# Patient Record
Sex: MALE | NOT HISPANIC OR LATINO | URBAN - METROPOLITAN AREA
[De-identification: names, ages, dates, MRNs, and addresses within clinical notes are randomized per-mention and may not be internally consistent; named-entity substitution may affect disease eponyms.]

---

## 2017-11-09 ENCOUNTER — IMPORTED ENCOUNTER (OUTPATIENT)
Dept: URBAN - METROPOLITAN AREA CLINIC 1 | Facility: CLINIC | Age: 52
End: 2017-11-09

## 2017-11-09 PROBLEM — H52.02: Noted: 2017-11-09

## 2017-11-09 PROBLEM — H52.4: Noted: 2017-11-09

## 2017-11-09 PROCEDURE — S0621 ROUTINE OPHTHALMOLOGICAL EXA: HCPCS

## 2017-11-09 NOTE — PATIENT DISCUSSION
1.  Hyperopia: Rx was given for corrective spectacles if indicated. 2.  Presbyopia: Rx was given for corrective spectacles if indicated. 3.  (Glaucoma suspect OU (0.75/0.7): Stable IOP and C/D OU. Past w/u essentially normal.)Return for an appointment for a 36 in 1 year with Dr. Deangelo Murphy.

## 2018-11-08 ENCOUNTER — IMPORTED ENCOUNTER (OUTPATIENT)
Dept: URBAN - METROPOLITAN AREA CLINIC 1 | Facility: CLINIC | Age: 53
End: 2018-11-08

## 2018-11-08 PROBLEM — Z01.00: Noted: 2018-11-08

## 2018-11-08 PROCEDURE — S0621 ROUTINE OPHTHALMOLOGICAL EXA: HCPCS

## 2018-11-08 NOTE — PATIENT DISCUSSION
1.  Routine Exam- Patient has minimal refractive error. 2.  Presbyopia: Rx was given for corrective spectacles if indicated. 3.  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 Past w/u essentially normal. Observe. Return for an appointment in 1 yr 36 with Dr. Riki Joseph.

## 2019-12-19 ENCOUNTER — IMPORTED ENCOUNTER (OUTPATIENT)
Dept: URBAN - METROPOLITAN AREA CLINIC 1 | Facility: CLINIC | Age: 54
End: 2019-12-19

## 2019-12-19 PROBLEM — H52.03: Noted: 2019-12-19

## 2019-12-19 PROBLEM — H52.4: Noted: 2019-12-19

## 2019-12-19 PROCEDURE — S0621 ROUTINE OPHTHALMOLOGICAL EXA: HCPCS

## 2019-12-19 NOTE — PATIENT DISCUSSION
1.  Hyperopia: Rx was given for corrective spectacles if indicated. 2.  [id number]. Cataract OU - Observe 4. Glaucoma suspect OU (0.75/0.70): Stable IOP and C/D OU. Past w/u essentially normal. Observe. Return for an appointment in 1 year 36 with Dr. Jaja Spann.

## 2020-11-13 ENCOUNTER — IMPORTED ENCOUNTER (OUTPATIENT)
Dept: URBAN - METROPOLITAN AREA CLINIC 1 | Facility: CLINIC | Age: 55
End: 2020-11-13

## 2020-11-13 PROBLEM — H52.4: Noted: 2020-11-13

## 2020-11-13 PROBLEM — H52.03: Noted: 2020-11-13

## 2020-11-13 PROCEDURE — S0621 ROUTINE OPHTHALMOLOGICAL EXA: HCPCS

## 2020-11-13 NOTE — PATIENT DISCUSSION
1.  Hyperopia: Rx was given for corrective spectacles if indicated. 2.  [id number]. Cataract OU - Observe 4. Glaucoma suspect OU (0.75/0.70): Stable IOP and C/D OU. Past w/u essentially normal. Observe. Return for an appointment in 1 year 36 with Dr. Riki Joseph.

## 2021-11-19 ENCOUNTER — IMPORTED ENCOUNTER (OUTPATIENT)
Dept: URBAN - METROPOLITAN AREA CLINIC 1 | Facility: CLINIC | Age: 56
End: 2021-11-19

## 2021-11-19 PROBLEM — H52.221: Noted: 2021-11-19

## 2021-11-19 PROBLEM — H52.4: Noted: 2021-11-19

## 2021-11-19 PROBLEM — H52.03: Noted: 2021-11-19

## 2021-11-19 PROCEDURE — S0621 ROUTINE OPHTHALMOLOGICAL EXA: HCPCS

## 2021-11-19 NOTE — PATIENT DISCUSSION
1.  Hyperopia w/ Astigmatism OD -- Rx was given for corrective spectacles if indicated. 2.  [id number]. Cataract OU - Observe 4. Glaucoma suspect OU (0.75/0.70): Stable IOP and C/D OU. Past w/u essentially normal. Observe. Return for an appointment in 1 year 36 with Dr. Azul Garcia.

## 2022-04-02 ASSESSMENT — TONOMETRY
OD_IOP_MMHG: 18
OS_IOP_MMHG: 16
OD_IOP_MMHG: 14
OD_IOP_MMHG: 16
OD_IOP_MMHG: 18
OS_IOP_MMHG: 17
OS_IOP_MMHG: 13
OD_IOP_MMHG: 17
OS_IOP_MMHG: 18
OD_IOP_MMHG: 13
OS_IOP_MMHG: 14
OS_IOP_MMHG: 18

## 2022-04-02 ASSESSMENT — VISUAL ACUITY
OS_CC: 20/20
OD_SC: 20/20
OD_CC: 20/20
OS_CC: J1+
OS_CC: J1
OD_CC: J1
OS_CC: J1+
OD_CC: 20/25+2
OS_CC: 20/20
OS_SC: 20/20
OD_CC: J1+
OS_CC: 20/30
OD_CC: J1+
OD_SC: 20/20
OS_SC: 20/20
OD_CC: 20/20

## 2022-11-21 ENCOUNTER — COMPREHENSIVE EXAM (OUTPATIENT)
Dept: URBAN - METROPOLITAN AREA CLINIC 1 | Facility: CLINIC | Age: 57
End: 2022-11-21

## 2022-11-21 DIAGNOSIS — H52.03: ICD-10-CM

## 2022-11-21 DIAGNOSIS — H52.4: ICD-10-CM

## 2022-11-21 DIAGNOSIS — H52.221: ICD-10-CM

## 2022-11-21 PROCEDURE — 92015 DETERMINE REFRACTIVE STATE: CPT

## 2022-11-21 PROCEDURE — 92014 COMPRE OPH EXAM EST PT 1/>: CPT

## 2022-11-21 ASSESSMENT — VISUAL ACUITY
OD_CC: 20/20-2
OD_CC: J1
OS_CC: J2
OS_CC: 20/20-1
OU_CC: J1+

## 2022-11-21 ASSESSMENT — TONOMETRY
OD_IOP_MMHG: 14
OS_IOP_MMHG: 14

## 2022-11-21 NOTE — PATIENT DISCUSSION
(0.75/0.70): IOP stable at 14 OU today. Past w/u essentially normal. Will f/u in 6 months 3-0/OCT. Patient is considered low risk. Condition was discussed with patient and patient understands. Will continue to monitor patient for any progression in condition. Patient was advised to call us with any problems, questions, or concerns.

## 2023-05-23 ENCOUNTER — COMPREHENSIVE EXAM (OUTPATIENT)
Dept: URBAN - METROPOLITAN AREA CLINIC 1 | Facility: CLINIC | Age: 58
End: 2023-05-23

## 2023-05-23 DIAGNOSIS — H40.013: ICD-10-CM

## 2023-05-23 DIAGNOSIS — H25.813: ICD-10-CM

## 2023-05-23 DIAGNOSIS — H43.813: ICD-10-CM

## 2023-05-23 PROCEDURE — 92014 COMPRE OPH EXAM EST PT 1/>: CPT

## 2023-05-23 PROCEDURE — 92133 CPTRZD OPH DX IMG PST SGM ON: CPT

## 2023-05-23 ASSESSMENT — VISUAL ACUITY
OS_CC: J1+
OD_CC: 20/30
OS_CC: 20/30
OD_CC: J1+

## 2023-05-23 ASSESSMENT — TONOMETRY
OS_IOP_MMHG: 14
OD_IOP_MMHG: 15

## 2023-12-01 ENCOUNTER — COMPREHENSIVE EXAM (OUTPATIENT)
Dept: URBAN - METROPOLITAN AREA CLINIC 1 | Facility: CLINIC | Age: 58
End: 2023-12-01

## 2023-12-01 DIAGNOSIS — H52.221: ICD-10-CM

## 2023-12-01 DIAGNOSIS — H52.03: ICD-10-CM

## 2023-12-01 DIAGNOSIS — H52.4: ICD-10-CM

## 2023-12-01 PROCEDURE — 92015 DETERMINE REFRACTIVE STATE: CPT

## 2023-12-01 PROCEDURE — 92014 COMPRE OPH EXAM EST PT 1/>: CPT

## 2023-12-01 ASSESSMENT — TONOMETRY
OS_IOP_MMHG: 15
OD_IOP_MMHG: 15

## 2023-12-01 ASSESSMENT — VISUAL ACUITY
OD_CC: J2
OS_SC: 20/25
OS_CC: J2
OD_SC: 20/20

## 2024-07-19 ENCOUNTER — COMPREHENSIVE EXAM (OUTPATIENT)
Dept: URBAN - METROPOLITAN AREA CLINIC 1 | Facility: CLINIC | Age: 59
End: 2024-07-19

## 2024-07-19 DIAGNOSIS — H40.023: ICD-10-CM

## 2024-07-19 DIAGNOSIS — H25.813: ICD-10-CM

## 2024-07-19 DIAGNOSIS — H43.813: ICD-10-CM

## 2024-07-19 DIAGNOSIS — H11.153: ICD-10-CM

## 2024-07-19 PROCEDURE — 92014 COMPRE OPH EXAM EST PT 1/>: CPT

## 2024-07-19 PROCEDURE — 92133 CPTRZD OPH DX IMG PST SGM ON: CPT

## 2024-07-19 ASSESSMENT — TONOMETRY
OD_IOP_MMHG: 12
OS_IOP_MMHG: 12

## 2024-07-19 ASSESSMENT — VISUAL ACUITY
OD_CC: J1+
OS_CC: J1+
OD_SC: 20/25
OS_SC: 20/25+2

## 2025-01-09 ENCOUNTER — COMPREHENSIVE EXAM (OUTPATIENT)
Age: 60
End: 2025-01-09

## 2025-01-09 DIAGNOSIS — H52.4: ICD-10-CM

## 2025-01-09 DIAGNOSIS — H52.223: ICD-10-CM

## 2025-01-09 DIAGNOSIS — H52.03: ICD-10-CM

## 2025-01-09 DIAGNOSIS — Z01.00: ICD-10-CM

## 2025-01-09 PROCEDURE — 92015 DETERMINE REFRACTIVE STATE: CPT

## 2025-01-09 PROCEDURE — 92014 COMPRE OPH EXAM EST PT 1/>: CPT

## 2025-07-21 ENCOUNTER — COMPREHENSIVE EXAM (OUTPATIENT)
Age: 60
End: 2025-07-21

## 2025-07-21 DIAGNOSIS — H43.813: ICD-10-CM

## 2025-07-21 DIAGNOSIS — H25.813: ICD-10-CM

## 2025-07-21 DIAGNOSIS — H11.153: ICD-10-CM

## 2025-07-21 DIAGNOSIS — H40.023: ICD-10-CM

## 2025-07-21 PROCEDURE — 92014 COMPRE OPH EXAM EST PT 1/>: CPT

## 2025-07-21 PROCEDURE — 92133 CPTRZD OPH DX IMG PST SGM ON: CPT
